# Patient Record
Sex: FEMALE | Race: OTHER | HISPANIC OR LATINO | URBAN - METROPOLITAN AREA
[De-identification: names, ages, dates, MRNs, and addresses within clinical notes are randomized per-mention and may not be internally consistent; named-entity substitution may affect disease eponyms.]

---

## 2018-09-08 ENCOUNTER — EMERGENCY (EMERGENCY)
Facility: HOSPITAL | Age: 38
LOS: 1 days | Discharge: HOME | End: 2018-09-08
Attending: EMERGENCY MEDICINE | Admitting: EMERGENCY MEDICINE
Payer: COMMERCIAL

## 2018-09-08 VITALS
RESPIRATION RATE: 16 BRPM | SYSTOLIC BLOOD PRESSURE: 110 MMHG | HEART RATE: 75 BPM | OXYGEN SATURATION: 100 % | DIASTOLIC BLOOD PRESSURE: 69 MMHG | TEMPERATURE: 98 F

## 2018-09-08 VITALS
HEIGHT: 56 IN | RESPIRATION RATE: 16 BRPM | SYSTOLIC BLOOD PRESSURE: 100 MMHG | TEMPERATURE: 99 F | WEIGHT: 119.93 LBS | HEART RATE: 81 BPM | OXYGEN SATURATION: 98 % | DIASTOLIC BLOOD PRESSURE: 64 MMHG

## 2018-09-08 LAB
ALBUMIN SERPL ELPH-MCNC: 4.2 G/DL — SIGNIFICANT CHANGE UP (ref 3.3–5)
ALP SERPL-CCNC: 42 U/L — SIGNIFICANT CHANGE UP (ref 40–120)
ALT FLD-CCNC: 12 U/L — SIGNIFICANT CHANGE UP (ref 10–45)
ANION GAP SERPL CALC-SCNC: 12 MMOL/L — SIGNIFICANT CHANGE UP (ref 5–17)
APPEARANCE UR: CLEAR — SIGNIFICANT CHANGE UP
AST SERPL-CCNC: 17 U/L — SIGNIFICANT CHANGE UP (ref 10–40)
BASOPHILS NFR BLD AUTO: 0.3 % — SIGNIFICANT CHANGE UP (ref 0–2)
BILIRUB SERPL-MCNC: 0.2 MG/DL — SIGNIFICANT CHANGE UP (ref 0.2–1.2)
BILIRUB UR-MCNC: NEGATIVE — SIGNIFICANT CHANGE UP
BUN SERPL-MCNC: 11 MG/DL — SIGNIFICANT CHANGE UP (ref 7–23)
CALCIUM SERPL-MCNC: 8.8 MG/DL — SIGNIFICANT CHANGE UP (ref 8.4–10.5)
CHLORIDE SERPL-SCNC: 102 MMOL/L — SIGNIFICANT CHANGE UP (ref 96–108)
CO2 SERPL-SCNC: 27 MMOL/L — SIGNIFICANT CHANGE UP (ref 22–31)
COLOR SPEC: YELLOW — SIGNIFICANT CHANGE UP
CREAT SERPL-MCNC: 0.65 MG/DL — SIGNIFICANT CHANGE UP (ref 0.5–1.3)
DIFF PNL FLD: NEGATIVE — SIGNIFICANT CHANGE UP
EOSINOPHIL NFR BLD AUTO: 1.4 % — SIGNIFICANT CHANGE UP (ref 0–6)
GLUCOSE SERPL-MCNC: 106 MG/DL — HIGH (ref 70–99)
GLUCOSE UR QL: NEGATIVE — SIGNIFICANT CHANGE UP
HCT VFR BLD CALC: 37.5 % — SIGNIFICANT CHANGE UP (ref 34.5–45)
HGB BLD-MCNC: 12.8 G/DL — SIGNIFICANT CHANGE UP (ref 11.5–15.5)
KETONES UR-MCNC: ABNORMAL MG/DL
LEUKOCYTE ESTERASE UR-ACNC: NEGATIVE — SIGNIFICANT CHANGE UP
LIDOCAIN IGE QN: 54 U/L — SIGNIFICANT CHANGE UP (ref 7–60)
LYMPHOCYTES # BLD AUTO: 46.6 % — HIGH (ref 13–44)
MCHC RBC-ENTMCNC: 29.6 PG — SIGNIFICANT CHANGE UP (ref 27–34)
MCHC RBC-ENTMCNC: 34.1 G/DL — SIGNIFICANT CHANGE UP (ref 32–36)
MCV RBC AUTO: 86.6 FL — SIGNIFICANT CHANGE UP (ref 80–100)
MONOCYTES NFR BLD AUTO: 8.7 % — SIGNIFICANT CHANGE UP (ref 2–14)
NEUTROPHILS NFR BLD AUTO: 43 % — SIGNIFICANT CHANGE UP (ref 43–77)
NITRITE UR-MCNC: NEGATIVE — SIGNIFICANT CHANGE UP
PH UR: 6.5 — SIGNIFICANT CHANGE UP (ref 5–8)
PLATELET # BLD AUTO: 152 K/UL — SIGNIFICANT CHANGE UP (ref 150–400)
POTASSIUM SERPL-MCNC: 4.3 MMOL/L — SIGNIFICANT CHANGE UP (ref 3.5–5.3)
POTASSIUM SERPL-SCNC: 4.3 MMOL/L — SIGNIFICANT CHANGE UP (ref 3.5–5.3)
PROT SERPL-MCNC: 6.6 G/DL — SIGNIFICANT CHANGE UP (ref 6–8.3)
PROT UR-MCNC: NEGATIVE MG/DL — SIGNIFICANT CHANGE UP
RBC # BLD: 4.33 M/UL — SIGNIFICANT CHANGE UP (ref 3.8–5.2)
RBC # FLD: 13.2 % — SIGNIFICANT CHANGE UP (ref 10.3–16.9)
SODIUM SERPL-SCNC: 141 MMOL/L — SIGNIFICANT CHANGE UP (ref 135–145)
SP GR SPEC: 1.02 — SIGNIFICANT CHANGE UP (ref 1–1.03)
UROBILINOGEN FLD QL: 0.2 E.U./DL — SIGNIFICANT CHANGE UP
WBC # BLD: 5.9 K/UL — SIGNIFICANT CHANGE UP (ref 3.8–10.5)
WBC # FLD AUTO: 5.9 K/UL — SIGNIFICANT CHANGE UP (ref 3.8–10.5)

## 2018-09-08 PROCEDURE — 99284 EMERGENCY DEPT VISIT MOD MDM: CPT | Mod: 25

## 2018-09-08 PROCEDURE — 96375 TX/PRO/DX INJ NEW DRUG ADDON: CPT

## 2018-09-08 PROCEDURE — 85025 COMPLETE CBC W/AUTO DIFF WBC: CPT

## 2018-09-08 PROCEDURE — 96374 THER/PROPH/DIAG INJ IV PUSH: CPT

## 2018-09-08 PROCEDURE — 81003 URINALYSIS AUTO W/O SCOPE: CPT

## 2018-09-08 PROCEDURE — 36415 COLL VENOUS BLD VENIPUNCTURE: CPT

## 2018-09-08 PROCEDURE — 83690 ASSAY OF LIPASE: CPT

## 2018-09-08 PROCEDURE — 96361 HYDRATE IV INFUSION ADD-ON: CPT

## 2018-09-08 PROCEDURE — 99284 EMERGENCY DEPT VISIT MOD MDM: CPT

## 2018-09-08 PROCEDURE — 80053 COMPREHEN METABOLIC PANEL: CPT

## 2018-09-08 RX ORDER — FAMOTIDINE 10 MG/ML
20 INJECTION INTRAVENOUS ONCE
Qty: 0 | Refills: 0 | Status: COMPLETED | OUTPATIENT
Start: 2018-09-08 | End: 2018-09-08

## 2018-09-08 RX ORDER — SODIUM CHLORIDE 9 MG/ML
1000 INJECTION INTRAMUSCULAR; INTRAVENOUS; SUBCUTANEOUS ONCE
Qty: 0 | Refills: 0 | Status: COMPLETED | OUTPATIENT
Start: 2018-09-08 | End: 2018-09-08

## 2018-09-08 RX ORDER — FAMOTIDINE 10 MG/ML
1 INJECTION INTRAVENOUS
Qty: 14 | Refills: 0 | OUTPATIENT
Start: 2018-09-08 | End: 2018-09-14

## 2018-09-08 RX ORDER — ONDANSETRON 8 MG/1
4 TABLET, FILM COATED ORAL ONCE
Qty: 0 | Refills: 0 | Status: COMPLETED | OUTPATIENT
Start: 2018-09-08 | End: 2018-09-08

## 2018-09-08 RX ORDER — SODIUM CHLORIDE 9 MG/ML
3 INJECTION INTRAMUSCULAR; INTRAVENOUS; SUBCUTANEOUS ONCE
Qty: 0 | Refills: 0 | Status: COMPLETED | OUTPATIENT
Start: 2018-09-08 | End: 2018-09-08

## 2018-09-08 RX ORDER — LIDOCAINE 4 G/100G
10 CREAM TOPICAL ONCE
Qty: 0 | Refills: 0 | Status: COMPLETED | OUTPATIENT
Start: 2018-09-08 | End: 2018-09-08

## 2018-09-08 RX ADMIN — Medication 30 MILLILITER(S): at 18:14

## 2018-09-08 RX ADMIN — LIDOCAINE 10 MILLILITER(S): 4 CREAM TOPICAL at 18:14

## 2018-09-08 RX ADMIN — FAMOTIDINE 20 MILLIGRAM(S): 10 INJECTION INTRAVENOUS at 18:14

## 2018-09-08 RX ADMIN — SODIUM CHLORIDE 3 MILLILITER(S): 9 INJECTION INTRAMUSCULAR; INTRAVENOUS; SUBCUTANEOUS at 18:17

## 2018-09-08 RX ADMIN — ONDANSETRON 4 MILLIGRAM(S): 8 TABLET, FILM COATED ORAL at 18:14

## 2018-09-08 RX ADMIN — SODIUM CHLORIDE 1000 MILLILITER(S): 9 INJECTION INTRAMUSCULAR; INTRAVENOUS; SUBCUTANEOUS at 18:14

## 2018-09-08 RX ADMIN — SODIUM CHLORIDE 1000 MILLILITER(S): 9 INJECTION INTRAMUSCULAR; INTRAVENOUS; SUBCUTANEOUS at 20:27

## 2018-09-08 NOTE — ED PROVIDER NOTE - MEDICAL DECISION MAKING DETAILS
pt w/epigastric pain and burning up the esophagus, serial benign abd exams, well appearing, given gi cocktail and h2 blocker - symptoms fully resolved, labs wnl, no emergent indication for any imaging, suspect GERD vs gastritis, importance of diet compliance and h2 blocker use discussed at length, gi f/u , pt understands and agrees w/plan

## 2018-09-08 NOTE — ED ADULT NURSE NOTE - NSIMPLEMENTINTERV_GEN_ALL_ED
Implemented All Universal Safety Interventions:  [place] to call system. Call bell, personal items and telephone within reach. Instruct patient to call for assistance. Room bathroom lighting operational. Non-slip footwear when patient is off stretcher. Physically safe environment: no spills, clutter or unnecessary equipment. Stretcher in lowest position, wheels locked, appropriate side rails in place.

## 2018-09-08 NOTE — ED ADULT NURSE NOTE - CHPI ED NUR SYMPTOMS NEG
no dysuria/no hematuria/no burning urination/no chills/no nausea/no diarrhea/no abdominal distension/no blood in stool/no fever/no vomiting

## 2018-09-08 NOTE — ED PROVIDER NOTE - EXITCARE/DISCHARGE INSTRUCTIONS
Reason for admission: CAD     Chief Complaint: shortness of breath and dyspnea on exertion    HPI:  Pt is 72 yo male with history significant for HTN, CAD sp MI, HLD, BPH abd aortic aneurysm  that was having increasing NAIR with minimal walking.  Jesus went in for eval with Cardiology and after positive stress test in February, had cath as outpt which confirmed severe CAD with critical LAD stenosis.  It was felt pt should have urgent CABG and Dr Lozano was asked to see pt at [facility].    Pt to [facility] for surgery in near future.  On Nitro gtt on transfer.  Currently he denies SOb, CP, fevers, chills new cough.      Past Medical History    Prostate disease                                              Essential (primary) hypertension                              High cholesterol                                              AAA (abdominal aortic aneurysm) (CMS/HCC)                     Myocardial infarction                                         Bronchitis                                                    Fracture                                                      Severe protein-calorie malnutrition (CMS/HCC)                 Past Surgical History    CARDIAC CATHETERIZATION                                       BACK SURGERY                                                  ABDOMINAL AORTIC ANEURYSM REPAIR, ENDOVASCULAR  2010 [facility]*    LEFT HEART CATH,PERCUTANEOUS                    2003 [facility]    CORONARY ANGIOPLASTY                            2007 [facility]*    COLONOSCOPY                                     01/26/2015    EYE SURGERY                                                     Comment: cataract    COLON SURGERY                                   2015          ALLERGIES:  No Known Allergies      Current Facility-Administered Medications   Medication   • sodium chloride 0.9% infusion   • acetaminophen (TYLENOL) tablet 650 mg   • cloNIDine (CATAPRES) tablet 0.1 mg   • hydrALAZINE (APRESOLINE) injection 10 mg   •  HYDROcodone-acetaminophen (NORCO) 5-325 MG per tablet 1-2 tablet   • metoPROLOL tartrate (LOPRESSOR) tablet 25 mg   • sodium chloride (PF) 0.9 % injection 2 mL   • sodium chloride (PF) 0.9 % injection 2 mL   • nitroGLYcerin (NITROSTAT) sublingual tablet 0.4 mg   • potassium chloride (K-DUR,KLOR-CON) CR tablet 20 mEq   • potassium chloride (KLOR-CON) packet 20 mEq   • potassium chloride 20 mEq/100mL IVPB premix   • potassium chloride (K-DUR,KLOR-CON) CR tablet 40 mEq   • potassium chloride (KLOR-CON) packet 40 mEq   • potassium chloride 20 mEq/100mL IVPB premix   • chlorhexidine gluconate (PERIDEX) 0.12 % solution 15 mL    And   • chlorhexidine gluconate (PERIDEX) 0.12 % solution 15 mL   • sodium chloride 0.9% infusion   • sodium chloride 0.9% infusion   • magnesium sulfate 1 g in dextrose 5% 100 mL IVPB premix   • magnesium sulfate 2 g in 50 mL premix IVPB   • magnesium sulfate 2 g in 50 mL premix IVPB   • ondansetron (ZOFRAN) injection 4 mg   • nitroGLYcerin 50 mg in dextrose 5% 250 mL infusion   • metoPROLOL tartrate (LOPRESSOR) tablet 25 mg   • AMIODARONE - PHARMACIST MONITORED   • metoCLOPramide (REGLAN) tablet 5 mg   • chlorhexidine gluconate (PERIDEX) 0.12 % solution 15 mL   • mupirocin (BACTROBAN) 2 % ointment 1 application   • mupirocin (BACTROBAN) 2 % ointment 1 application       History   Smoking Status   • Former Smoker   • Packs/day: 1.00   • Years: 48.00   • Types: Cigarettes   • Start date: 1/1/1961   • Quit date: 1/8/2000   Smokeless Tobacco   • Never Used       History   Alcohol Use No       History   Drug Use No       Occupation: retired      Family History   Problem Relation Age of Onset   • Heart disease Mother    • Heart disease Father      Family history reviewed.  Pertinent cardiac family history: no*    Review of Systems:  10 point review of systems negative except for those mentioned above in HPI.     Physical Exam:    Visit Vitals  /60   Pulse 73   Temp 98.4 °F (36.9 °C) (Oral)   Resp  18   Ht 5' 8\" (1.727 m)   Wt 67.2 kg   SpO2 94%   BMI 22.53 kg/m²     Ht Readings from Last 1 Encounters:   03/14/18 5' 8\" (1.727 m)     Wt Readings from Last 1 Encounters:   03/14/18 67.2 kg     Body mass index is 22.53 kg/m².    Tele: SR80s    General: male no acute distress, well developed and well nourished  Eyes: normal sclerae and normal conjunctivae, EOMs intact   Mouth: dentition adequate repair, tongue midline, mucous membranes moist  Neck: no trachea deviation/tenderness/no lymphadneopathy  Cardiovascular:normal S1/S2, no murmur/rub, no edema and normal pedal pulses  Extremities: no stasis changes in bilateral lower extremities and normal strength in bilateral lower extremities   Respiratory: no wheezing/crackles/rhonchi/stridor and no accessory muscle use or retractions  Abdomen: no tenderness and no masses, non distended  Skin: warm/dry, no lesions  Psych: alert, appropriate   Neuro: grossly intact, no focal deficits noted    Labs:    Lab Results   Component Value Date    SODIUM 137 03/15/2018    POTASSIUM 3.7 03/15/2018    CHLORIDE 104 03/15/2018    CO2 24 03/15/2018    GLUCOSE 118 (H) 03/15/2018    BUN 15 03/15/2018    CREATININE 0.80 03/15/2018    CALCIUM 8.3 (L) 03/15/2018    ALBUMIN 3.1 (L) 03/15/2018    BILIRUBIN 0.5 03/15/2018    AST 21 03/15/2018    INR 1.1 03/15/2018     Lab Results   Component Value Date    PTT 23 01/08/2015    WBC 14.3 (H) 03/15/2018    HGB 13.2 03/15/2018    HCT 38.8 (L) 03/15/2018     03/15/2018    RAPDTR 0.11 (HH) 03/15/2018    TSH 1.152 03/15/2018       Diagnostics:    Cardiac catheterization   Left Main   The vessel is free of disease.   Left Anterior Descending   Ost LAD to Prox LAD lesion. , 50% stenosed.   Mid LAD lesion. , 90% stenosed.   Left Circumflex   Prox Cx to Mid Cx lesion. , 90% stenosed.   Second Obtuse Marginal Branch   2nd Mrg filled by collaterals from RPDA.   2nd Mrg lesion. , 100% stenosed.   Right Coronary Artery   Mid RCA lesion. , 70%  stenosed.   Right Posterior Descending Artery   RPDA lesion. , 70% stenosed.   Coronary Diagrams     Diagnostic Diagram              Echocardiogram 12/17  Mildly decreased left ventricular systolic function. Left ventricular ejection fraction, 42 %.  Mildly increased septal wall thickness.  Increased left ventricular filling pressure.  Increased left atrial size.  Trace-to-mild tricuspid valve regurgitation.  Mildly dilated ascending aorta, 3.6 cm.  Previously reported LVEF 58    Impression:  Severe CAD   HTN  HLD      Plan:  Urgent CABG on Friday 3/16  Further risks, benefits and plans, including timing of any procedures, per Dr Lozano       3/15/2018     Launch [employer] and print the 'Prescriptions from this Visit' Report

## 2018-09-08 NOTE — ED PROVIDER NOTE - OBJECTIVE STATEMENT
The pt is a 39 y/o F, who presents to ED c/o upper abd cramping and acid feeling going up to chest x 1 wk. Pt states that the cramping is 2/10 when present, related to food ingestion, acid goes up to throat, supposed to be on ppi but has not been taking. Also states that strained to have a bm a few d ago and saw a spec of blood but nothing since.  Denies n/v/d, fevers, chills, flank pain, cp, sob.

## 2018-09-08 NOTE — ED PROVIDER NOTE - ATTENDING CONTRIBUTION TO CARE
patient in ED w concern for epigastric discomfort, on and off for the past week.  Related with rich foods.  + Constipation.  On my face to face ED eval, patient is non toxic in appearance.  HRRR, lungs clear.  No reproducible tenderness to palpation throughout entire abdomen.  Pepcid and GI cocktail given in ED with improvement in symptoms.  Will discharge with antacids and GI follow up.  Patient aware of plan and agreeable.

## 2018-09-08 NOTE — ED ADULT NURSE NOTE - OBJECTIVE STATEMENT
The pt is a 37 y/o female who came in c/o burning epigastric pain for a few days. Pt denies nausea, vomiting or any other complaints.

## 2018-09-12 DIAGNOSIS — R10.10 UPPER ABDOMINAL PAIN, UNSPECIFIED: ICD-10-CM

## 2018-09-12 DIAGNOSIS — K21.9 GASTRO-ESOPHAGEAL REFLUX DISEASE WITHOUT ESOPHAGITIS: ICD-10-CM

## 2018-12-07 ENCOUNTER — EMERGENCY (EMERGENCY)
Facility: HOSPITAL | Age: 38
LOS: 1 days | Discharge: HOME | End: 2018-12-07
Admitting: EMERGENCY MEDICINE
Payer: COMMERCIAL

## 2018-12-07 VITALS
SYSTOLIC BLOOD PRESSURE: 99 MMHG | OXYGEN SATURATION: 100 % | TEMPERATURE: 98 F | HEART RATE: 84 BPM | RESPIRATION RATE: 16 BRPM | DIASTOLIC BLOOD PRESSURE: 64 MMHG

## 2018-12-07 DIAGNOSIS — M62.830 MUSCLE SPASM OF BACK: ICD-10-CM

## 2018-12-07 DIAGNOSIS — Z79.899 OTHER LONG TERM (CURRENT) DRUG THERAPY: ICD-10-CM

## 2018-12-07 DIAGNOSIS — M54.5 LOW BACK PAIN: ICD-10-CM

## 2018-12-07 PROCEDURE — 96372 THER/PROPH/DIAG INJ SC/IM: CPT

## 2018-12-07 PROCEDURE — 99283 EMERGENCY DEPT VISIT LOW MDM: CPT

## 2018-12-07 PROCEDURE — 99283 EMERGENCY DEPT VISIT LOW MDM: CPT | Mod: 25

## 2018-12-07 RX ORDER — METHOCARBAMOL 500 MG/1
1 TABLET, FILM COATED ORAL
Qty: 15 | Refills: 0 | OUTPATIENT
Start: 2018-12-07 | End: 2018-12-11

## 2018-12-07 RX ORDER — KETOROLAC TROMETHAMINE 30 MG/ML
30 SYRINGE (ML) INJECTION ONCE
Qty: 0 | Refills: 0 | Status: DISCONTINUED | OUTPATIENT
Start: 2018-12-07 | End: 2018-12-07

## 2018-12-07 RX ADMIN — Medication 30 MILLIGRAM(S): at 12:38

## 2018-12-07 NOTE — ED ADULT NURSE NOTE - OBJECTIVE STATEMENT
Pt states she felt back pain to her left middle back on Wednesday but then the pain became worse this morning and Motrin does  not work for pain relief.

## 2018-12-07 NOTE — ED PROVIDER NOTE - CHPI ED SYMPTOMS NEG
no motor function loss/no tingling/no difficulty bearing weight/no bowel dysfunction/no neck tenderness/no numbness/no bladder dysfunction

## 2018-12-07 NOTE — ED ADULT NURSE NOTE - NSIMPLEMENTINTERV_GEN_ALL_ED
Implemented All Universal Safety Interventions:  [place] to call system. Call bell, personal items and telephone within reach. Instruct patient to call for assistance. Room bathroom lighting operational. Non-slip footwear when patient is off stretcher. Physically safe environment: no spills, clutter or unnecessary equipment. Stretcher in lowest position, wheels locked, appropriate side rails in place.

## 2018-12-07 NOTE — ED ADULT NURSE NOTE - CHPI ED NUR SYMPTOMS NEG
no tingling/no motor function loss/no numbness/no bladder dysfunction/no bowel dysfunction/no constipation/no fatigue/no anorexia

## 2018-12-07 NOTE — ED PROVIDER NOTE - NS CPE EDP MUSC LUMBAR LOC
tenderness/+ tenderness with palpation and ROM. + left sided sciatica pain. No motor or sensory deficit, NVI.

## 2018-12-07 NOTE — ED PROVIDER NOTE - OBJECTIVE STATEMENT
37 y/o f with h/o  back pain present to ED c/o lower bilateral back pain worsening turning and lying down. Patient is a house keeper for a long time.  No h/o spinal or back  injury. Denies abd pain, n, v, dysuria, paresthesia, paresis, or incontinence.

## 2018-12-07 NOTE — ED PROVIDER NOTE - CARE PROVIDER_API CALL
Mary Reina), Orthopaedic Surgery  [address]  [address] [place] [address]  Phone: [phone]  Fax: [phone]

## 2018-12-07 NOTE — ED ADULT TRIAGE NOTE - OTHER COMPLAINTS
pt c.o lower back pain x 1 week. denies fever/chills. denies fall. admits to taking ibuprofen without relief.

## 2022-11-15 ENCOUNTER — EMERGENCY (EMERGENCY)
Facility: HOSPITAL | Age: 42
LOS: 1 days | Discharge: HOME | End: 2022-11-15
Admitting: EMERGENCY MEDICINE
Payer: MEDICAID

## 2022-11-15 VITALS
TEMPERATURE: 98 F | DIASTOLIC BLOOD PRESSURE: 77 MMHG | HEIGHT: 56 IN | HEART RATE: 102 BPM | OXYGEN SATURATION: 97 % | WEIGHT: 126.1 LBS | SYSTOLIC BLOOD PRESSURE: 110 MMHG | RESPIRATION RATE: 18 BRPM

## 2022-11-15 PROCEDURE — 99284 EMERGENCY DEPT VISIT MOD MDM: CPT | Mod: 25

## 2022-11-15 PROCEDURE — 73030 X-RAY EXAM OF SHOULDER: CPT | Mod: 26,RT

## 2022-11-15 PROCEDURE — 73030 X-RAY EXAM OF SHOULDER: CPT

## 2022-11-15 PROCEDURE — 96372 THER/PROPH/DIAG INJ SC/IM: CPT

## 2022-11-15 PROCEDURE — 99283 EMERGENCY DEPT VISIT LOW MDM: CPT | Mod: 25

## 2022-11-15 RX ORDER — MELOXICAM 15 MG/1
1 TABLET ORAL
Qty: 15 | Refills: 0
Start: 2022-11-15 | End: 2022-11-29

## 2022-11-15 RX ORDER — KETOROLAC TROMETHAMINE 30 MG/ML
30 SYRINGE (ML) INJECTION ONCE
Refills: 0 | Status: DISCONTINUED | OUTPATIENT
Start: 2022-11-15 | End: 2022-11-15

## 2022-11-15 RX ADMIN — Medication 30 MILLIGRAM(S): at 11:55

## 2022-11-15 NOTE — ED PROVIDER NOTE - NSFOLLOWUPINSTRUCTIONS_ED_ALL_ED_FT
Dolor en el hombro    Shoulder Pain      Muchas cosas pueden provocar dolor en el hombro, por ejemplo:  •Anabella lesión en el hombro.      •El uso excesivo del hombro.      •Artritis.      La causa del dolor puede ser lo siguiente:  •Inflamación.      •Anabella lesión en la articulación del hombro.      •Anabella lesión en un tendón, ligamento o hueso.        Siga estas indicaciones en bedolla casa:    Esté atento a los cambios en los síntomas. Informe a bedolla médico acerca de cualquier cambio. Siga estas indicaciones para aliviar el dolor.    Si tiene un cabestrillo:     •Úselo jessica se lo haya indicado el médico. Quíteselo solamente jessica se lo haya indicado el médico.       • Afloje el cabestrillo si los dedos de la mano se le adormecen, siente hormigueo o se le enfrían y se ponen azules.      •Mantenga el cabestrillo limpio.    •Si el cabestrillo no es impermeable:  •No deje que se moje. Quíteselo para ducharse o para bañarse.         •Mueva el brazo lo menos posible, markus mantenga la mano en movimiento para evitar la hinchazón.        Control del dolor, la rigidez y la hinchazón    •Si se lo indican, aplique hielo sobre la dheeraj del dolor:  •Ponga el hielo en anabella bolsa plástica.      •Coloque anabella toalla entre la piel y la bolsa.      •Coloque el hielo edy 20 minutos, 2 a 3 veces al día. Deje de aplicar hielo si no ayuda a aliviar el dolor.        •Apriete anabella pelota blanda o anabella almohadilla de goma tanto jessica sea posible. Kechi ayuda e prevenir la hinchazón en el hombro. También ayuda a fortalecer el brazo.      Indicaciones generales     •[place] los medicamentos de venta nolan y los recetados solamente jessica se lo haya indicado el médico.      •Concurra a todas las visitas de seguimiento jessica se lo haya indicado el médico. Kechi es importante.        Comuníquese con un médico si:    •El dolor empeora.      •El dolor no se melissa con los medicamentos.      •Aparece un dolor nuevo en el brazo, la mano o los dedos.        Solicite ayuda inmediatamente si:  •El brazo, la mano o los dedos:  •Hormiguean.      •Se adormecen.      •Se hinchan.      •Duelen.      •Se tornan de color zamora o tolu.          Resumen    •La causa del dolor en el hombro puede ser anabella lesión, el uso excesivo o la artritis.      •Esté atento a los cambios en los síntomas. Informe a bedolla médico acerca de cualquier cambio.      •Esta afección se puede tratar con un cabestrillo, hielo y medicamentos para el dolor.      •Comuníquese con bedolla médico si el dolor empeora o tiene un dolor nuevo. Solicite ayuda de inmediato si el brazo, la mano o los dedos se le adormecen o si siente hormigueo, se le hinchan o le duelen.      •Concurra a todas las visitas de seguimiento jessica se lo haya indicado el médico. Kechi es importante.      Esta información no tiene jessica fin reemplazar el consejo del médico. Asegúrese de hacerle al médico cualquier pregunta que tenga.

## 2022-11-15 NOTE — ED PROVIDER NOTE - OBJECTIVE STATEMENT
# [id number]    41 y/o f presents c/o right neck pain, shoulder and arm pain over the past 6 months.  Pt stating she takes lyrica and ibuprofen which haven't been helping recently, the pain in her shoulder is worse.  Pt stating pain is worse with movement.  Denies numbness/tingling to ext, weakness, fall/trauma, all other ROS negative.

## 2022-11-15 NOTE — ED ADULT NURSE NOTE - CHIEF COMPLAINT QUOTE
Pt c/o R shoulder pain [id number] worsening over the past week. States "I got an injection in my spinal and the pain went away but now I am having pain again". Denies numbness, tingling, chest pain.

## 2022-11-15 NOTE — ED ADULT TRIAGE NOTE - CHIEF COMPLAINT QUOTE
Pt c/o R shoulder pain [id number] worsening over the past week. States "I got an injection in my spinal and the pain went away but now I am having pain again". Denies numbness, tingling, chest pain.

## 2022-11-15 NOTE — ED ADULT NURSE NOTE - PRO INTERPRETER NEED 2
Phone call to mom to let her know that she needs to come in a sign a new consent for surgery.  She's told we are in the office today until 5 or Monday in [place] until 5.  She states she will stop by the [place] office on Monday 5/23.   [place]

## 2022-11-15 NOTE — ED PROVIDER NOTE - CLINICAL SUMMARY MEDICAL DECISION MAKING FREE TEXT BOX
43 y/o f presents c/o right neck, shoulder and arm pain over the past 6 months, worsening pain in right shoulder recently.  Ext NVI, xr neg.  Pt given toradol in ED, will d/c with rx meloxicam to take instead of ibuprofen, to f/u with ortho outpatient, possibly needing PT.

## 2022-11-15 NOTE — ED PROVIDER NOTE - MUSCULOSKELETAL, MLM
no midline spine tenderness, right shoulder and trapezius with mild generalized tenderness, no swelling, no deformity, limited ROM 2/2 pain, strength 5/5, sensation intact distally

## 2022-11-15 NOTE — ED ADULT NURSE NOTE - OBJECTIVE STATEMENT
Pt A&Ox4, ambulatory with steady gait, speaking in clear/full sentences, no acute distress, vital signs stable. Pt presents to the ED for r arm pain since yesterday and increased neck pain x 1 week with left arm numbness x 1 week. Pt endorses hx of similar problem in 2020, pt reports  she received an injection by an orthopedist and pain went away. Pt has hx of fibromyalgia, on lyrica. Pt follows with rheumatologist.

## 2022-11-16 PROBLEM — Z00.00 ENCOUNTER FOR PREVENTIVE HEALTH EXAMINATION: Status: ACTIVE | Noted: 2022-11-16

## 2022-11-17 DIAGNOSIS — M79.7 FIBROMYALGIA: ICD-10-CM

## 2022-11-17 DIAGNOSIS — M79.601 PAIN IN RIGHT ARM: ICD-10-CM

## 2022-11-17 DIAGNOSIS — M54.2 CERVICALGIA: ICD-10-CM

## 2022-11-17 DIAGNOSIS — M25.511 PAIN IN RIGHT SHOULDER: ICD-10-CM

## 2022-11-29 ENCOUNTER — APPOINTMENT (OUTPATIENT)
Dept: ORTHOPEDIC SURGERY | Facility: CLINIC | Age: 42
End: 2022-11-29
Payer: MEDICAID

## 2022-11-29 VITALS — HEART RATE: 75 BPM | DIASTOLIC BLOOD PRESSURE: 64 MMHG | SYSTOLIC BLOOD PRESSURE: 102 MMHG | OXYGEN SATURATION: 99 %

## 2022-11-29 DIAGNOSIS — Z82.61 FAMILY HISTORY OF ARTHRITIS: ICD-10-CM

## 2022-11-29 DIAGNOSIS — Z72.3 LACK OF PHYSICAL EXERCISE: ICD-10-CM

## 2022-11-29 DIAGNOSIS — Z78.9 OTHER SPECIFIED HEALTH STATUS: ICD-10-CM

## 2022-11-29 DIAGNOSIS — M54.12 RADICULOPATHY, CERVICAL REGION: ICD-10-CM

## 2022-11-29 DIAGNOSIS — M50.30 OTHER CERVICAL DISC DEGENERATION, UNSPECIFIED CERVICAL REGION: ICD-10-CM

## 2022-11-29 PROCEDURE — 99204 OFFICE O/P NEW MOD 45 MIN: CPT

## 2022-11-29 PROCEDURE — 72050 X-RAY EXAM NECK SPINE 4/5VWS: CPT

## 2022-11-29 RX ORDER — METHYLPREDNISOLONE 4 MG/1
4 TABLET ORAL
Qty: 1 | Refills: 0 | Status: ACTIVE | COMMUNITY
Start: 2022-11-29 | End: 1900-01-01

## 2022-12-05 PROBLEM — Z78.9 NON-SMOKER: Status: ACTIVE | Noted: 2022-12-05

## 2022-12-05 PROBLEM — Z78.9 NO PERTINENT PAST MEDICAL HISTORY: Status: RESOLVED | Noted: 2022-12-05 | Resolved: 2022-12-05

## 2022-12-05 PROBLEM — Z82.61 FAMILY HISTORY OF ARTHRITIS: Status: ACTIVE | Noted: 2022-11-29

## 2022-12-05 PROBLEM — Z72.3 DOES NOT EXERCISE: Status: ACTIVE | Noted: 2022-12-05

## 2022-12-05 RX ORDER — PREGABALIN 300 MG/1
CAPSULE ORAL
Refills: 0 | Status: ACTIVE | COMMUNITY

## 2022-12-19 PROBLEM — M79.7 FIBROMYALGIA: Chronic | Status: ACTIVE | Noted: 2022-11-15

## 2023-01-09 ENCOUNTER — APPOINTMENT (OUTPATIENT)
Dept: ORTHOPEDIC SURGERY | Facility: CLINIC | Age: 43
End: 2023-01-09
Payer: MEDICAID

## 2023-01-09 DIAGNOSIS — G89.29 LOW BACK PAIN, UNSPECIFIED: ICD-10-CM

## 2023-01-09 DIAGNOSIS — M54.2 CERVICALGIA: ICD-10-CM

## 2023-01-09 DIAGNOSIS — M54.50 LOW BACK PAIN, UNSPECIFIED: ICD-10-CM

## 2023-01-09 DIAGNOSIS — G89.29 CERVICALGIA: ICD-10-CM

## 2023-01-09 PROCEDURE — 99214 OFFICE O/P EST MOD 30 MIN: CPT

## 2023-01-11 PROBLEM — M50.30 DEGENERATIVE CERVICAL DISC: Status: ACTIVE | Noted: 2023-01-11

## 2023-01-11 PROBLEM — M54.2 CHRONIC CERVICAL PAIN: Status: ACTIVE | Noted: 2023-01-10

## 2023-01-11 PROBLEM — M54.50 CHRONIC LUMBAR PAIN: Status: ACTIVE | Noted: 2023-01-10

## 2023-01-11 NOTE — HISTORY OF PRESENT ILLNESS
[de-identified] : Follow up 01/09/2023: Ms. Cooper is here for follow up. She continues to have pain in her neck that radiates into both of her upper extremities. She also has significant upper back pain, mid back pain, and lower back pain, the lower back pain she states radiates into her bilateral lower extremities.\par \par Initial visit 11/29/2022: Patient was referred to us by the [facility] ED. She reports a long standing history of neck pain into both shoulders and also pain from both elbows into her forearms that has been going on for several years. She also reports diffuse body aches and pain for which she had a rheumatologic workup. She was recently diagnosed with fibromyalgia and started on pregabalin and duloxetine without significant improvement of her symptoms. She was seen in the ED on 11/15/2022 for sudden onset of severe right shoulder pain and diffuse paresthesias on her left upper extremity to all fingers. She states she received a Toradol injection in the ED which resolved her right shoulder pain, however, her left upper extremity paresthesias persist.

## 2023-01-11 NOTE — PHYSICAL EXAM
[de-identified] : Physical Exam:\par \par General: patient is well developed, well nourished, in no acute\par distress, alert and oriented x 3.\par \par Mood and affect: normal\par \par Respiratory: no respiratory distress noted\par \par Skin: no scars over spine, skin intact, no erythema, increased warmth\par \par Alignment:T he spine is well compensated in the coronal and sagittal plane.\par \par Gait: The patient is able to toe walk and heel walk without difficulty.\par \par Palpation: Tender to palpation cervical paraspinal region.\par \par Range of motion: Cervical spine ROM is restricted.\par \par Neurologic Exam:\par Motor: Manual Muscle testing in the upper and lower extremities is 5 out of 5 in all muscle groups. There is no evidence of\par muscular atrophy in the upper extremities. Sensory: Sensation to light touch is grossly intact in the upper and lower\par extremities\par \par Reflexes: DTR are present and symmetric throughout, negative grier bilaterally, negative inverted radial reflex bilaterally,\par no clonus, plantar responses are flexor\par \par Special tests: Spurlings sign absent. Lhermitte's sign is absent.\par \par Vascular: Examination of the peripheral vascular system demonstrates no evidence of congestion or edema. no\par lymphedema bilateral lower extremities, pulses are present and symmetric in both lower extremities. [de-identified] : MRI Cervical Spine 12/15/2022 [my read]: Multilevel mild disc degeneration. At C5/6 and C6/7 there is moderate left sided foraminal narrowing, there is no significant right sided foraminal narrowing seen at her segments. \par \par MRI Lumbar Spine 08/04/2021 [my read]: No significant neurocompressive lesion, mild disc bulge seen at L4/5.\par \par XR cervical 4 view 11/29/22 (my read): mild multilevel degenerative changes, no spondylolisthesis or dynamic instability, no acute fractures seen

## 2023-01-11 NOTE — REASON FOR VISIT
[Initial Visit] : an initial visit for [Neck Pain] : neck pain [Interpreters_IDNumber] : [id number] ; [id number] [Interpreters_FullName] : Ana Koehler [TWNoteComboBox1] : [place]

## 2023-01-11 NOTE — END OF VISIT
[FreeTextEntry3] : All medical record entries made by the Scribe were at my, Dr. Jose Mooney, direction and personally dictated by me on 11/29/2022. I have reviewed the chart and agree that the record accurately reflects my personal performance of the history, physical exam, assessment and plan. I have also personally directed, reviewed, and agreed with the chart.

## 2023-01-11 NOTE — ADDENDUM
[FreeTextEntry1] : Documented by Whitney Ugalde acting as a scribe for Dr. Jose Mooney on 11/30/2022.

## 2023-01-11 NOTE — END OF VISIT
[FreeTextEntry3] : All medical record entries made by the Scribe were at my, Dr. Jose Mooney, direction and personally dictated by me on 01/09/2023. I have reviewed the chart and agree that the record accurately reflects my personal performance of the history, physical exam, assessment and plan. I have also personally directed, reviewed, and agreed with the chart.

## 2023-01-11 NOTE — HISTORY OF PRESENT ILLNESS
[de-identified] : Initial visit 11/29/2022: Patient was referred to us by the [facility] ED. She reports a long standing history of neck pain into both shoulders and also pain from both elbows into her forearms that has been going on for several years. She also reports diffuse body aches and pain for which she had a rheumatologic workup. She was recently diagnosed with fibromyalgia and started on pregabalin and duloxetine without significant improvement of her symptoms. She was seen in the ED on 11/15/2022 for sudden onset of severe right shoulder pain and diffuse paresthesias on her left upper extremity to all fingers. She states she received a Toradol injection in the ED which resolved her right shoulder pain, however, her left upper extremity paresthesias persist.

## 2023-01-11 NOTE — DISCUSSION/SUMMARY
[de-identified] : Discussed my findings with the patient. Ms. Cooper reports chronic neck pain into both shoulders. Now with left upper extremity paresthesias. No relief with pregabalin or ibuprofen. I am recommending an MRI cervical without contrast for further evaluation, order given. She was also given a prescription and instructions for medrol dose pack. Patient instructed to discontinue ibuprofen until steroid taper completed. She will follow up with me after imaging has been completed, sooner if there is an issue. All questions answered.

## 2023-01-11 NOTE — PHYSICAL EXAM
[de-identified] : Physical Exam:\par \par General: patient is well developed, well nourished, in no acute\par distress, alert and oriented x 3.\par \par Mood and affect: normal\par \par Respiratory: no respiratory distress noted\par \par Skin: no scars over spine, skin intact, no erythema, increased warmth\par \par Alignment:T he spine is well compensated in the coronal and sagittal plane.\par \par Gait: The patient is able to toe walk and heel walk without difficulty.\par \par Palpation: Tender to palpation cervical paraspinal region.\par \par Range of motion: Cervical spine ROM is restricted.\par \par Neurologic Exam:\par Motor: Manual Muscle testing in the upper and lower extremities is 5 out of 5 in all muscle groups. There is no evidence of\par muscular atrophy in the upper extremities. Sensory: Sensation to light touch is grossly intact in the upper and lower\par extremities\par \par Reflexes: DTR are present and symmetric throughout, negative grier bilaterally, negative inverted radial reflex bilaterally,\par no clonus, plantar responses are flexor\par \par Special tests: Spurlings sign absent. Lhermitte's sign is absent.\par \par Vascular: Examination of the peripheral vascular system demonstrates no evidence of congestion or edema. no\par lymphedema bilateral lower extremities, pulses are present and symmetric in both lower extremities. [de-identified] : XR cervical 4 view 11/29/22 (my read): mild multilevel degenerative changes, no spondylolisthesis or dynamic instability, no acute fractures seen

## 2023-01-11 NOTE — ADDENDUM
[FreeTextEntry1] : Documented by Whitney Ugalde acting as a scribe for Dr. Jose Mooney on 01/10/2023.

## 2023-01-11 NOTE — DISCUSSION/SUMMARY
[de-identified] : Diagnosis: Chronic cervical pain, chronic lumbar pain, fibromyalgia.\par \par I have discussed my findings with the patient. In light of her total spinal column pain as well as radiation into her upper and lower extremities without significant structural correlation, I do not believe that surgery would be effective for her symptoms. I recommended that she continue with pain management things like physical therapy. I have also recommended that she should have a neurology consultation to rule out nonstructural reasons for her radiating type symptoms into her upper and lower extremities. She will follow up with me on an as needed basis.

## 2023-05-25 ENCOUNTER — APPOINTMENT (OUTPATIENT)
Age: 43
End: 2023-05-25
Payer: MEDICAID

## 2023-05-25 ENCOUNTER — NON-APPOINTMENT (OUTPATIENT)
Age: 43
End: 2023-05-25

## 2023-05-25 VITALS
TEMPERATURE: 98 F | HEART RATE: 92 BPM | HEIGHT: 56 IN | DIASTOLIC BLOOD PRESSURE: 73 MMHG | BODY MASS INDEX: 26.99 KG/M2 | RESPIRATION RATE: 16 BRPM | WEIGHT: 120 LBS | OXYGEN SATURATION: 99 % | SYSTOLIC BLOOD PRESSURE: 108 MMHG

## 2023-05-25 DIAGNOSIS — M79.7 FIBROMYALGIA: ICD-10-CM

## 2023-05-25 PROCEDURE — 99205 OFFICE O/P NEW HI 60 MIN: CPT

## 2023-05-25 NOTE — HISTORY OF PRESENT ILLNESS
[FreeTextEntry1] : WANDA MOURA is a 43 year who presents with pain \par \par about 10 years of pain in shoulders, neck hips.  has had severe pain that makes her weak.  also pain when moving arms and in elbows.  more numbness when lifting heavy things.  doesn't wake up with numbness. generally feels shoulders are weak. \par \par she does have fam hx of lupus, fibromyalgia.\par \par has been many years of frustration with medicine, feels like people don't understand what she is going through\par \par lyrica did help but was too sedating for daytime use.  cymbalta started by PCP about 6-7 months ago. taking 30mg. has some dryness in mouth from it.  has had many injections.\par \par doesn't bring labs\par \par Denies diplopia, blurred vision, dysphagia, dysarthria, aphasia, bowel or bladder dysfunction, imbalance, falls, headaches.\par

## 2023-05-25 NOTE — DATA REVIEWED
[de-identified] : MRI C spine serpt and dec 2020 Mod sev narrowing C5-C6 and C6-Cy7 on L\par \par EMG 2/17/2022 uppers normal, study reviewed in detail and appears technically well done\par \par MRI shoulder with rotator cuff injury and labral tear\par bone scan normal

## 2023-05-25 NOTE — PHYSICAL EXAM
[FreeTextEntry1] : General: this is a pleasant patient in no acute distress\par \par HEENT conjunctiva are normal, no tenderness in head\par \par CV: normal pulses, regular rate and rhythm, no peripheral edema noted\par \par Lungs: breathing is non-labored\par \par abd: soft and non-distended\par \par MSK:\par SLR: \par ROGER:\par range of motion:\par tinnels: \par spurling:\par Occipital nerve tenderness:\par \par Mental status:\par Alert and oriented to person, place and time, normal speech and comprehension\par \par Cranial Nerves:\par extra-occular movements in tact without nystagmus, normal saccades and smooth pursuit, Face symmetric and facial strength symmetric, facial sensation symmetric, \par \par Motor: normal bulk and tone throughout. no abnormal movements.  generalized weakness 4/5 strength uppers and lower extremities proximally and distally\par \par Sensory: in tact and symmetric to vibration, light tough, temperature\par \par Cerebellar: normal finger-nose-finger bilaterally\par \par Reflexes: 2+ in the upper and lower extremities and symmetric.  toes are bilaterally downgoing.\par \par Gait: stable, able to tip toe heel and tandem\par \par Stances:\par Romberg: normal\par

## 2023-05-25 NOTE — CONSULT LETTER
[Dear  ___] : Dear  [unfilled], [Courtesy Letter:] : I had the pleasure of seeing your patient, [unfilled], in my office today. [Please see my note below.] : Please see my note below. [Consult Closing:] : Thank you very much for allowing me to participate in the care of this patient.  If you have any questions, please do not hesitate to contact me. [Sincerely,] : Sincerely, [FreeTextEntry3] : Bryan Goetz MD